# Patient Record
Sex: MALE | Race: WHITE | NOT HISPANIC OR LATINO | Employment: FULL TIME | URBAN - METROPOLITAN AREA
[De-identification: names, ages, dates, MRNs, and addresses within clinical notes are randomized per-mention and may not be internally consistent; named-entity substitution may affect disease eponyms.]

---

## 2019-10-01 ENCOUNTER — HOSPITAL ENCOUNTER (EMERGENCY)
Facility: HOSPITAL | Age: 34
Discharge: HOME | End: 2019-10-01
Attending: EMERGENCY MEDICINE

## 2019-10-01 VITALS
HEIGHT: 71 IN | SYSTOLIC BLOOD PRESSURE: 114 MMHG | OXYGEN SATURATION: 99 % | BODY MASS INDEX: 16.8 KG/M2 | HEART RATE: 65 BPM | RESPIRATION RATE: 18 BRPM | TEMPERATURE: 99 F | WEIGHT: 120 LBS | DIASTOLIC BLOOD PRESSURE: 68 MMHG

## 2019-10-01 DIAGNOSIS — M79.673 FOOT PAIN: ICD-10-CM

## 2019-10-01 DIAGNOSIS — S92.901A CLOSED FRACTURE OF RIGHT FOOT, INITIAL ENCOUNTER: Primary | ICD-10-CM

## 2019-10-01 PROCEDURE — 99284 EMERGENCY DEPT VISIT MOD MDM: CPT | Mod: 25

## 2019-10-01 PROCEDURE — 25000003 PHARM REV CODE 250: Performed by: NURSE PRACTITIONER

## 2019-10-01 RX ORDER — ACETAMINOPHEN AND CODEINE PHOSPHATE 300; 30 MG/1; MG/1
1 TABLET ORAL EVERY 6 HOURS PRN
Qty: 10 TABLET | Refills: 0 | Status: SHIPPED | OUTPATIENT
Start: 2019-10-01 | End: 2019-10-11

## 2019-10-01 RX ORDER — IBUPROFEN 600 MG/1
600 TABLET ORAL
Status: COMPLETED | OUTPATIENT
Start: 2019-10-01 | End: 2019-10-01

## 2019-10-01 RX ORDER — IBUPROFEN 600 MG/1
600 TABLET ORAL EVERY 6 HOURS PRN
Qty: 20 TABLET | Refills: 0 | Status: SHIPPED | OUTPATIENT
Start: 2019-10-01 | End: 2020-01-10

## 2019-10-01 RX ADMIN — IBUPROFEN 600 MG: 600 TABLET, FILM COATED ORAL at 02:10

## 2019-10-01 NOTE — ED PROVIDER NOTES
Encounter Date: 10/1/2019    SCRIBE #1 NOTE: I, Peter Rivers, am scribing for, and in the presence of,  Jeffrey Gonzalez NP. I have scribed the entire note.       History     Chief Complaint   Patient presents with    Foot Injury     Pt presents to ED today c/o right foot injury reports he was pulling a 3000 lbs tool box down incline and tripped he attempted to stop tool box from roll. Tool box hit back of foot. pt reports he is unable to bear weight on foot incident occured x 2 hours ago      Erwin Ho is a 34 y.o. male who  has no past medical history on file.    The patient presents to the ED due to a foot injury. Patient reports he was pulling a 3000 lbs tool box down incline and tripped, and the tool box hit the back of his foot, then rolled over his foot. Patient denies previous injuries to the foot. Denies hitting head and LOC. He denies numbness/tingling to the foot. He is unable to bear weight to the foot. Denies any other concerns at this time.     The history is provided by the patient.     Review of patient's allergies indicates:  No Known Allergies  History reviewed. No pertinent past medical history.  No past surgical history on file.  History reviewed. No pertinent family history.  Social History     Tobacco Use    Smoking status: Not on file   Substance Use Topics    Alcohol use: Not on file    Drug use: Not on file     Review of Systems   Constitutional: Negative for fever.   Musculoskeletal: Positive for arthralgias.   Neurological: Negative for weakness and numbness.   Hematological: Does not bruise/bleed easily.   All other systems reviewed and are negative.      Physical Exam     Initial Vitals [10/01/19 1332]   BP Pulse Resp Temp SpO2   122/68 80 19 -- 100 %      MAP       --         Physical Exam    Vitals reviewed.  Constitutional: He appears well-developed and well-nourished.  Non-toxic appearance. He does not have a sickly appearance.   HENT:   Head: Atraumatic.   Mouth/Throat:  Oropharynx is clear and moist.   Eyes: EOM are normal.   Neck: Normal range of motion, full passive range of motion without pain and phonation normal. Neck supple.   Cardiovascular: Regular rhythm.   Pulses:       Dorsalis pedis pulses are 2+ on the right side, and 2+ on the left side.   Pulmonary/Chest: No respiratory distress.   Musculoskeletal:        Right foot: There is decreased range of motion, tenderness and swelling. There is no bony tenderness, normal capillary refill, no crepitus, no deformity and no laceration.   Sensation and strength intact in BLE.  Dorsalis pedis equal bilaterally.  Minimal swelling and bruising noted to lateral aspect of right foot.  No TTP to base of 5th metatarsal.  No TTP to tib-fib.  No overlying warmth or surrounding erythema.   Neurological: He is alert and oriented to person, place, and time. He has normal strength. No sensory deficit.   Skin: Skin is warm.   Psychiatric: He has a normal mood and affect.         ED Course   Orthopedic Injury  Date/Time: 10/1/2019 8:44 PM  Performed by: Jeffrey Gonzalez NP  Authorized by: Guy J. Lefort, MD     Consent Done?:  Not Needed  Injury:     Injury location:  Foot    Location details:  Right foot    Injury type:  Fracture      Pre-procedure assessment:     Neurovascular status: Neurovascularly intact      Distal perfusion: normal      Neurological function: normal      Range of motion: normal      Local anesthesia used?: No      Patient sedated?: No        Selections made in this section will also lock the Injury type section above.:     Immobilization: post-op boot.    Supplies used:  Cotton padding    Complications: No      Specimens: No      Implants: No    Post-procedure assessment:     Neurovascular status: Neurovascularly intact      Distal perfusion: normal      Neurological function: normal      Range of motion: normal      Patient tolerance:  Patient tolerated the procedure well with no immediate complications      Labs  Reviewed - No data to display       Imaging Results           X-Ray Foot Complete Right (Final result)  Result time 10/01/19 15:51:20    Final result by Randy Gonzales MD (10/01/19 15:51:20)                 Impression:      Findings suspicious for a small minimally displaced fracture on the dorsal aspect of the hindfoot. This finding is only visible in the lateral projection.    This report was flagged in Epic as abnormal.      Electronically signed by: Randy Gonzales MD  Date:    10/01/2019  Time:    15:51             Narrative:    EXAMINATION:  XR FOOT COMPLETE 3 VIEW RIGHT    CLINICAL HISTORY:  . Pain in unspecified foot    TECHNIQUE:  AP, lateral, and oblique views of the right foot were performed.    COMPARISON:  None    FINDINGS:  There is a small fracture fragment projecting in the dorsal soft tissues of the hindfoot adjacent to the cuboid bone on the lateral projection.  Associated overlying soft tissue swelling.                                 Medical Decision Making:   History:   Old Medical Records: I decided to obtain old medical records.  Initial Assessment:   The patient presents to the ED due to a foot injury. Patient reports he was pulling a 3000 lbs tool box down incline and tripped, and the tool box hit the back of his foot, then rolled over his foot. Patient denies previous injuries to the foot. Denies hitting head and LOC. He denies numbness/tingling to the foot. He is unable to bear weight to the foot. Denies any other concerns at this time.       Clinical Tests:   Radiological Study: Reviewed and Ordered  ED Management:  Xray, ice, PO ibuprofen   No signs of septic joint or cellulitis.  Xray shows findings suspicious for a small minimally displaced fracture on the dorsal aspect of the hindfoot. This finding is only visible in the lateral projection.  Patient will be placed in post-op boot.  Patient is hemodynamically stable be discharged home with prescriptions for ibuprofen and Tylenol 3.   Narcotic precautions given.  Patient instructed not to take other NSAIDs with ibuprofen such as Aleve, Advil, naproxen, or aspirin.  Rice principles reviewed.  Instructed to follow up with Ortho in 2-3 days, stay weight-bearing as tolerated and to return to ED for any concerns or worsening symptoms.  Patient verbalized understanding, compliance, and agreement treatment plan.  Ambulatory referral to ortho placed.  Other:   I discussed test(s) with the performing physician.       <> Summary of the Findings: Care of this patient discussed with Dr. Lefort who agrees with ED course and disposition.                      Clinical Impression:       ICD-10-CM ICD-9-CM   1. Closed fracture of right foot, initial encounter S92.901A 825.20   2. Foot pain M79.673 729.5                 I, Jeffrey Gonzalez, personally performed the services described in this documentation. All medical record entries made by the scribe were at my direction and in my presence.  I have reviewed the chart and agree that the record reflects my personal performance and is accurate and complete. FANI Lisa.  9:00 PM 10/01/2019                 Jeffrey Gonzalez NP  10/01/19 2100

## 2019-10-01 NOTE — ED NOTES
Pain/swelling/bruising to right lateral foot distal to ankle.  approx 3000 lbs tool box rolled into side of foot 2 hours ago at work.  Unable to bear weight on foot.  +p/s/m to right toes with cap refill < 2 sec.  Palpable pedal pulses.  Ice pack applied.      APPEARANCE: Alert, oriented and in no acute distress.  CARDIAC: Normal rate and rhythm.   PERIPHERAL VASCULAR: peripheral pulses present. Normal cap refill. No edema. Warm to touch.    RESPIRATORY:Normal rate and effort. Respirations are equal and unlabored no obvious signs of distress.  GASTRO: soft, no tenderness, no abdominal distention.  SKIN: Skin is warm and dry, normal skin turgor, mucous membranes moist.  MENTAL STATUS: awake, alert and aware of environment.  EYE: PERRL, both eyes: pupils brisk and reactive to light. Normal size.  ENT: EARS: no obvious drainage. NOSE: no active bleeding.

## 2019-10-01 NOTE — DISCHARGE INSTRUCTIONS
Use crutches and stay weight-bearing as tolerated.  Take prescribed medications as labeled as needed for pain. Do not drive, drink alcohol, operate machinery while taking pain medication.  Follow up with Ortho in 2-3 days return to ED for any concerns or worsening symptoms.

## 2020-01-10 ENCOUNTER — OFFICE VISIT (OUTPATIENT)
Dept: URGENT CARE | Facility: CLINIC | Age: 35
End: 2020-01-10
Payer: OTHER MISCELLANEOUS

## 2020-01-10 VITALS
SYSTOLIC BLOOD PRESSURE: 104 MMHG | WEIGHT: 120 LBS | HEART RATE: 88 BPM | DIASTOLIC BLOOD PRESSURE: 60 MMHG | BODY MASS INDEX: 16.8 KG/M2 | RESPIRATION RATE: 18 BRPM | HEIGHT: 71 IN | TEMPERATURE: 98 F

## 2020-01-10 DIAGNOSIS — M79.672 LEFT FOOT PAIN: ICD-10-CM

## 2020-01-10 DIAGNOSIS — Z02.83 ENCOUNTER FOR DRUG SCREENING: ICD-10-CM

## 2020-01-10 DIAGNOSIS — S90.32XA CONTUSION OF LEFT FOOT, INITIAL ENCOUNTER: Primary | ICD-10-CM

## 2020-01-10 DIAGNOSIS — S90.812A ABRASION, LEFT FOOT, INITIAL ENCOUNTER: ICD-10-CM
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
## 2020-01-10 LAB — BREATH ALCOHOL: 0

## 2020-01-10 PROCEDURE — 99203 PR OFFICE/OUTPT VISIT, NEW, LEVL III, 30-44 MIN: ICD-10-PCS | Mod: S$GLB,,, | Performed by: PHYSICIAN ASSISTANT

## 2020-01-10 PROCEDURE — 82075 POCT BREATH ALCOHOL TEST: ICD-10-PCS | Mod: S$GLB,,, | Performed by: PHYSICIAN ASSISTANT

## 2020-01-10 PROCEDURE — 80305 OOH NON-DOT DRUG SCREEN: ICD-10-PCS | Mod: S$GLB,,, | Performed by: PHYSICIAN ASSISTANT

## 2020-01-10 PROCEDURE — 82075 ASSAY OF BREATH ETHANOL: CPT | Mod: S$GLB,,, | Performed by: PHYSICIAN ASSISTANT

## 2020-01-10 PROCEDURE — 99203 OFFICE O/P NEW LOW 30 MIN: CPT | Mod: S$GLB,,, | Performed by: PHYSICIAN ASSISTANT

## 2020-01-10 PROCEDURE — 73630 X-RAY EXAM OF FOOT: CPT | Mod: FY,LT,S$GLB, | Performed by: RADIOLOGY

## 2020-01-10 PROCEDURE — 80305 DRUG TEST PRSMV DIR OPT OBS: CPT | Mod: S$GLB,,, | Performed by: PHYSICIAN ASSISTANT

## 2020-01-10 PROCEDURE — 73630 XR FOOT COMPLETE 3 VIEW LEFT: ICD-10-PCS | Mod: FY,LT,S$GLB, | Performed by: RADIOLOGY

## 2020-01-10 RX ORDER — IBUPROFEN 400 MG/1
400 TABLET ORAL 4 TIMES DAILY
Qty: 30 TABLET | Refills: 0 | Status: SHIPPED | OUTPATIENT
Start: 2020-01-10

## 2020-01-10 NOTE — LETTER
[facility]  [address]  [place] [address]  Phone: [phone]  Fax: [phone]  [facility]: [id number]    Pt Name: Erwin Jarrett Date: 01/09/2020   Employee ID: [id number] Date of First Treatment: 01/10/2020   Company: [employer]      Appointment Time: 11:40 AM Arrived: 11:50 AM   Provider: Erwin Sequeira PA-C Time Out: 2:10 PM     Office Treatment:   EXAM  X-RAYS  RX GIVEN  DRUG SCREEN  HOME TODAY- SUNDAY( 01/10/2020-01/12/2020)  REGULAR DUTY BEGAN ON 01/13/2020    1. Contusion of left foot, initial encounter    2. Left foot pain    3. Abrasion, left foot, initial encounter    4. Work related injury      Medications Ordered This Encounter   Medications    ibuprofen (ADVIL,MOTRIN) 400 MG tablet      Patient Instructions: (Ice 2-3 times daily for 30 min.  Elevate often.)    Restrictions: Home today, Regular Duty(May resume regular duty Monday 01/13/2020)     Return Appointment: 01/17/2020 at 11:00 AM  [facility]

## 2020-01-10 NOTE — PROGRESS NOTES
Subjective:       Patient ID: Erwin Ho is a 34 y.o. male.    Chief Complaint: Foot Injury (Left)    W/C- New injury- Left foot (DOI 1/09/2020)- Patient was removing a forestry door and he slipped and the door fell on his left foot. He complains of constant throbbing pain, swelling and unable to put pressure on his foot. He states he did not have swelling yesterday when it happened and it got worse overnight. Pain level 8/10 on today. [place]    Foot Injury    The incident occurred 12 to 24 hours ago. The incident occurred at work. The injury mechanism was a direct blow. The pain is present in the left foot. The pain is at a severity of 8/10. The pain is severe. The pain has been constant since onset. Associated symptoms include an inability to bear weight. Pertinent negatives include no loss of motion, loss of sensation, muscle weakness, numbness or tingling. The symptoms are aggravated by weight bearing. He has tried ice and NSAIDs for the symptoms. The treatment provided no relief.       Constitution: Negative for fatigue.   HENT: Negative for facial swelling and facial trauma.    Neck: Negative for neck stiffness.   Cardiovascular: Negative for chest trauma.   Eyes: Negative for eye trauma, double vision and blurred vision.   Gastrointestinal: Negative for abdominal trauma, abdominal pain and rectal bleeding.   Genitourinary: Negative for hematuria, genital trauma and pelvic pain.   Musculoskeletal: Positive for pain, trauma and pain with walking. Negative for joint swelling and abnormal ROM of joint.   Skin: Negative for color change, wound, abrasion and laceration.   Neurological: Negative for dizziness, history of vertigo, light-headedness, coordination disturbances, altered mental status, loss of consciousness and numbness.   Hematologic/Lymphatic: Negative for history of bleeding disorder.   Psychiatric/Behavioral: Negative for altered mental status.        Objective:      Physical Exam   Constitutional: He  appears well-developed and well-nourished. He is active. No distress.   HENT:   Head: Normocephalic and atraumatic.   Right Ear: Hearing and external ear normal.   Left Ear: Hearing and external ear normal.   Nose: Nose normal. No nasal deformity. No epistaxis.   Mouth/Throat: Oropharynx is clear and moist and mucous membranes are normal.   Eyes: Conjunctivae and lids are normal. No scleral icterus.   Neck: Trachea normal and normal range of motion.   Cardiovascular: Intact distal pulses and normal pulses.   Pulmonary/Chest: Effort normal. No stridor. No respiratory distress.   Musculoskeletal:        Left foot: There is tenderness (There is no tenderness of the great toe.  Findings on x-ray does not correlate with current injury.) and swelling.        Feet:    Neurological: He is alert. He has normal strength. He is not disoriented. No sensory deficit. GCS eye subscore is 4. GCS verbal subscore is 5. GCS motor subscore is 6.   Skin: Skin is warm, dry and intact. Capillary refill takes less than 2 seconds. He is not diaphoretic.   Psychiatric: He has a normal mood and affect. His speech is normal and behavior is normal. He is attentive.   Nursing note and vitals reviewed.        X-ray Foot Complete Left    Result Date: 1/10/2020  EXAMINATION: XR FOOT COMPLETE 3 VIEW LEFT CLINICAL HISTORY: dropped heavy object onto foot yesterday;  Pain in left foot FINDINGS: Three views: There is a small chronic appearing remote fracture with nonunion involving the proximal medial intra-articular aspect of the distal phalanx of the 1st digit.  No definite acute fracture dislocation bone destruction seen. Electronically signed by: Matthew Farrell MD Date:    01/10/2020 Time:    13:55    Assessment:       1. Contusion of left foot, initial encounter    2. Left foot pain    3. Abrasion, left foot, initial encounter    4. Work related injury        Plan:         Medications Ordered This Encounter   Medications    ibuprofen  (ADVIL,MOTRIN) 400 MG tablet     Sig: Take 1 tablet (400 mg total) by mouth 4 (four) times daily. Take with food.     Dispense:  30 tablet     Refill:  0     Patient Instructions: (Ice 2-3 times daily for 30 min.  Elevate often.)   Restrictions: Home today, Regular Duty(May resume regular duty Monday 01/13/2020)  Follow up in about 1 week (around 1/17/2020).

## 2020-01-17 ENCOUNTER — OFFICE VISIT (OUTPATIENT)
Dept: URGENT CARE | Facility: CLINIC | Age: 35
End: 2020-01-17
Payer: OTHER MISCELLANEOUS

## 2020-01-17 DIAGNOSIS — S90.812D ABRASION, LEFT FOOT, SUBSEQUENT ENCOUNTER: ICD-10-CM

## 2020-01-17 DIAGNOSIS — S90.32XD CONTUSION OF LEFT FOOT, SUBSEQUENT ENCOUNTER: Primary | ICD-10-CM
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
## 2020-01-17 PROCEDURE — 99214 PR OFFICE/OUTPT VISIT, EST, LEVL IV, 30-39 MIN: ICD-10-PCS | Mod: S$GLB,,, | Performed by: PHYSICIAN ASSISTANT

## 2020-01-17 PROCEDURE — 99214 OFFICE O/P EST MOD 30 MIN: CPT | Mod: S$GLB,,, | Performed by: PHYSICIAN ASSISTANT

## 2020-01-17 NOTE — LETTER
[facility]  [place]  [address] [place] [address]  Phone: [phone]  Fax: [phone]  [facility]: [id number]    Pt Name: Erwin Ho  Injury Date: 01/09/2020   Employee ID: [id number] Date of Treatment: 01/17/2020   Company: [employer]      Appointment Time: 10:45 AM Arrived: 11:00 a.m.   Provider: Erwin Sequeira PA-C Time Out: 11:59 a.m.     Office Treatment:   EXAM  Patient Instructions: (Ice 2-3 times daily 30 min.  Ibuprofen 400 mg 4 times daily as needed.  Tylenol 1000 mg 4 times daily as needed for pain.)  Restrictions: Regular Duty    1. Contusion of left foot, subsequent encounter    2. Abrasion, left foot, subsequent encounter    3. Work related injury          Patient Instructions: (Ice 2-3 times daily 30 min.  Ibuprofen 400 mg 4 times daily as needed.  Tylenol 1000 mg 4 times daily as needed for pain.)    Restrictions: Regular Duty     Return Appointment: 1/24/2020 at 11:00 a.m.  [place]

## 2020-01-17 NOTE — PROGRESS NOTES
Subjective:       Patient ID: Erwin Ho is a 34 y.o. male.    Chief Complaint: Foot Injury (LT)    Pt returned to the clinic today for a follow up visit for a LT FOOT injury. Pt states his injury has improved a little since his last visit. He just have constant throbbing in his foot when sleeping. IJ    Foot Injury    The incident occurred 12 to 24 hours ago. The incident occurred at work. The injury mechanism was a direct blow. The pain is present in the left foot. The pain is at a severity of 6/10. The pain is severe. The pain has been constant since onset. Associated symptoms include an inability to bear weight. Pertinent negatives include no loss of motion, loss of sensation, muscle weakness, numbness or tingling. The symptoms are aggravated by weight bearing. He has tried ice and NSAIDs for the symptoms. The treatment provided no relief.       Neurological: Negative for numbness.        Objective:      Physical Exam   Constitutional: He appears well-developed and well-nourished. He is active. No distress.   HENT:   Head: Normocephalic and atraumatic.   Right Ear: Hearing and external ear normal.   Left Ear: Hearing and external ear normal.   Nose: Nose normal. No nasal deformity. No epistaxis.   Mouth/Throat: Oropharynx is clear and moist and mucous membranes are normal.   Eyes: Conjunctivae and lids are normal. No scleral icterus.   Neck: Trachea normal and normal range of motion.   Cardiovascular: Intact distal pulses and normal pulses.   Pulmonary/Chest: Effort normal. No stridor. No respiratory distress.   Musculoskeletal:        Left foot: There is tenderness and swelling.        Feet:    Neurological: He is alert. He has normal strength. He is not disoriented. No sensory deficit. GCS eye subscore is 4. GCS verbal subscore is 5. GCS motor subscore is 6.   Skin: Skin is warm, dry and intact. Capillary refill takes less than 2 seconds. He is not diaphoretic.   Psychiatric: He has a normal mood and  affect. His speech is normal and behavior is normal. He is attentive.   Nursing note reviewed.      Assessment:       1. Contusion of left foot, subsequent encounter    2. Abrasion, left foot, subsequent encounter    3. Work related injury        Plan:            Patient Instructions: (Ice 2-3 times daily 30 min.  Ibuprofen 400 mg 4 times daily as needed.  Tylenol 1000 mg 4 times daily as needed for pain.)   Restrictions: Regular Duty  Follow up in about 1 week (around 1/24/2020).